# Patient Record
Sex: FEMALE | Race: WHITE | NOT HISPANIC OR LATINO | Employment: UNEMPLOYED | URBAN - NONMETROPOLITAN AREA
[De-identification: names, ages, dates, MRNs, and addresses within clinical notes are randomized per-mention and may not be internally consistent; named-entity substitution may affect disease eponyms.]

---

## 2017-04-13 LAB
EXTERNAL ABO GROUPING: NORMAL
EXTERNAL ANTIBODY SCREEN: POSITIVE
EXTERNAL CHLAMYDIA SCREEN: NEGATIVE
EXTERNAL GONORRHEA SCREEN: NEGATIVE
EXTERNAL HEPATITIS B SURFACE ANTIGEN: NEGATIVE
EXTERNAL RH FACTOR: NEGATIVE
EXTERNAL VDRL: NORMAL
HIV1 P24 AG SERPL QL IA: NEGATIVE

## 2017-10-04 LAB — EXTERNAL GROUP B STREP ANTIGEN: NEGATIVE

## 2017-10-14 ENCOUNTER — HOSPITAL ENCOUNTER (OUTPATIENT)
Facility: HOSPITAL | Age: 32
Discharge: HOME | End: 2017-10-14
Attending: OBSTETRICS & GYNECOLOGY | Admitting: OBSTETRICS & GYNECOLOGY

## 2017-10-14 VITALS
TEMPERATURE: 98.2 F | OXYGEN SATURATION: 98 % | HEART RATE: 100 BPM | HEIGHT: 69 IN | WEIGHT: 230 LBS | RESPIRATION RATE: 20 BRPM | BODY MASS INDEX: 34.07 KG/M2

## 2017-10-14 PROCEDURE — 59025 FETAL NON-STRESS TEST: CPT

## 2017-10-14 PROCEDURE — G0463 HOSPITAL OUTPT CLINIC VISIT: HCPCS

## 2017-10-14 RX ORDER — ACETAMINOPHEN 325 MG/1
TABLET ORAL
Status: DISCONTINUED
Start: 2017-10-14 | End: 2017-10-14 | Stop reason: HOSPADM

## 2017-10-14 RX ORDER — HYDROXYZINE PAMOATE 25 MG/1
CAPSULE ORAL
Status: COMPLETED
Start: 2017-10-14 | End: 2017-10-14

## 2017-10-14 RX ORDER — BUTALBITAL, ACETAMINOPHEN AND CAFFEINE 50; 325; 40 MG/1; MG/1; MG/1
1 TABLET ORAL EVERY 6 HOURS SCHEDULED
COMMUNITY

## 2017-10-14 RX ORDER — ACETAMINOPHEN 325 MG/1
TABLET ORAL
Status: COMPLETED
Start: 2017-10-14 | End: 2017-10-14

## 2017-10-14 RX ORDER — GABAPENTIN 600 MG/1
600 TABLET ORAL 3 TIMES DAILY
COMMUNITY

## 2017-10-14 RX ORDER — ACETAMINOPHEN 325 MG/1
650 TABLET ORAL ONCE
Status: COMPLETED | OUTPATIENT
Start: 2017-10-14 | End: 2017-10-14

## 2017-10-14 RX ORDER — HYDROXYZINE PAMOATE 25 MG/1
25 CAPSULE ORAL ONCE
Status: COMPLETED | OUTPATIENT
Start: 2017-10-14 | End: 2017-10-14

## 2017-10-14 RX ADMIN — ACETAMINOPHEN 650 MG: 325 TABLET ORAL at 20:57

## 2017-10-14 RX ADMIN — HYDROXYZINE PAMOATE 25 MG: 25 CAPSULE ORAL at 20:58

## 2017-10-15 NOTE — NON STRESS TEST
Brian Bartlett, a  at 37w2d with an LJ of 2017, by Ultrasound, was seen at [facility] LABOR DELIVERY for a nonstress test.    Chief Complaint   Patient presents with   • Contractions     Contractions increase in strength and closeness since 10/13/17 1400.        Interpretation A  Nonstress Test Interpretation A: Reactive (10/14/17 2008 : Malcolm Murphy RN)  Comments A: Assessed by AUGUSTINE Rojas and AUGUSTINE Evans (10/14/17 2008 : Malcolm Murphy RN)

## 2017-10-15 NOTE — DISCHARGE INSTRUCTIONS
Return if contractions increase in strength and closeness. (Approx 2-5 minutes apart for at least an hour) Return if you are leaking fluids, have bright red vaginal bleeding, or a decrease in fetal movement. Call MD with any questions.     
13-May-2017 22:12

## 2017-10-20 ENCOUNTER — HOSPITAL ENCOUNTER (OUTPATIENT)
Facility: HOSPITAL | Age: 32
Discharge: HOME | End: 2017-10-20
Attending: OBSTETRICS & GYNECOLOGY | Admitting: OBSTETRICS & GYNECOLOGY

## 2017-10-20 VITALS — RESPIRATION RATE: 20 BRPM | OXYGEN SATURATION: 99 % | TEMPERATURE: 97.9 F | HEART RATE: 92 BPM

## 2017-10-20 LAB
BACTERIA UR QL AUTO: ABNORMAL /HPF
BILIRUB UR QL STRIP: NEGATIVE
CANDIDA ALBICANS: NEGATIVE
CLARITY UR: ABNORMAL
COLOR UR: YELLOW
GARDNERELLA VAGINALIS: NEGATIVE
GLUCOSE UR STRIP-MCNC: NEGATIVE MG/DL
HGB UR QL STRIP.AUTO: NEGATIVE
HYALINE CASTS UR QL AUTO: ABNORMAL /LPF
KETONES UR QL STRIP: NEGATIVE
LEUKOCYTE ESTERASE UR QL STRIP.AUTO: ABNORMAL
NITRITE UR QL STRIP: NEGATIVE
PH UR STRIP.AUTO: 7 [PH] (ref 5–9)
PROT UR QL STRIP: NEGATIVE
RBC # UR: ABNORMAL /HPF
REF LAB TEST METHOD: ABNORMAL
SP GR UR STRIP: 1.02 (ref 1–1.03)
SQUAMOUS #/AREA URNS HPF: ABNORMAL /HPF
TRICHOMONAS VAGINALIS PCR: NEGATIVE
UNIDENT CRYS URNS QL MICRO: ABNORMAL /HPF
UROBILINOGEN UR QL STRIP: ABNORMAL
WBC UR QL AUTO: ABNORMAL /HPF

## 2017-10-20 PROCEDURE — 87660 TRICHOMONAS VAGIN DIR PROBE: CPT | Performed by: OBSTETRICS & GYNECOLOGY

## 2017-10-20 PROCEDURE — G0463 HOSPITAL OUTPT CLINIC VISIT: HCPCS | Performed by: NURSE PRACTITIONER

## 2017-10-20 PROCEDURE — 87480 CANDIDA DNA DIR PROBE: CPT | Performed by: OBSTETRICS & GYNECOLOGY

## 2017-10-20 PROCEDURE — 59025 FETAL NON-STRESS TEST: CPT | Performed by: NURSE PRACTITIONER

## 2017-10-20 PROCEDURE — 87510 GARDNER VAG DNA DIR PROBE: CPT | Performed by: OBSTETRICS & GYNECOLOGY

## 2017-10-20 PROCEDURE — 81001 URINALYSIS AUTO W/SCOPE: CPT | Performed by: OBSTETRICS & GYNECOLOGY

## 2017-10-21 NOTE — NON STRESS TEST
Brian LASHAE Bartlett, a  at 38w1d with an LJ of 2017, by Ultrasound, was seen at [facility] LABOR DELIVERY for a nonstress test.    Pt presents with c/o ctx and reports they began hurting this afternoon around 1400 and were about 8 minutes apart.  Pt denies any vaginal bleeding or leaking of fluid.

## 2017-10-21 NOTE — DISCHARGE INSTR - OTHER ORDERS
Return to labor and delivery for painful, regular contractions, bright red vaginal bleeding, decreased fetal movement, or if your water breaks.  Keep your next appointment.

## 2017-10-23 ENCOUNTER — HOSPITAL ENCOUNTER (OUTPATIENT)
Facility: HOSPITAL | Age: 32
Discharge: HOME | End: 2017-10-23
Attending: OBSTETRICS & GYNECOLOGY | Admitting: OBSTETRICS & GYNECOLOGY

## 2017-10-23 VITALS
DIASTOLIC BLOOD PRESSURE: 65 MMHG | RESPIRATION RATE: 20 BRPM | TEMPERATURE: 98.1 F | OXYGEN SATURATION: 99 % | HEART RATE: 88 BPM | SYSTOLIC BLOOD PRESSURE: 107 MMHG

## 2017-10-23 LAB
AMPHET+METHAMPHET UR QL: NEGATIVE
BACTERIA UR QL AUTO: ABNORMAL /HPF
BARBITURATES UR QL SCN: NEGATIVE
BENZODIAZ UR QL SCN: NEGATIVE
BILIRUB UR QL STRIP: NEGATIVE
CANDIDA ALBICANS: NEGATIVE
CANNABINOIDS SERPL QL: NEGATIVE
CLARITY UR: ABNORMAL
COCAINE UR QL: NEGATIVE
COLOR UR: YELLOW
GARDNERELLA VAGINALIS: POSITIVE
GLUCOSE UR STRIP-MCNC: NEGATIVE MG/DL
HGB UR QL STRIP.AUTO: ABNORMAL
HYALINE CASTS UR QL AUTO: ABNORMAL /LPF
KETONES UR QL STRIP: NEGATIVE
LEUKOCYTE ESTERASE UR QL STRIP.AUTO: ABNORMAL
METHADONE UR QL SCN: NEGATIVE
NITRITE UR QL STRIP: NEGATIVE
OPIATES UR QL: NEGATIVE
OXYCODONE UR QL SCN: NEGATIVE
PH UR STRIP.AUTO: 6 [PH] (ref 5–9)
PROT UR QL STRIP: NEGATIVE
RBC # UR: ABNORMAL /HPF
REF LAB TEST METHOD: ABNORMAL
SP GR UR STRIP: 1.02 (ref 1–1.03)
SQUAMOUS #/AREA URNS HPF: ABNORMAL /HPF
TRICHOMONAS VAGINALIS PCR: NEGATIVE
UROBILINOGEN UR QL STRIP: ABNORMAL
WBC UR QL AUTO: ABNORMAL /HPF

## 2017-10-23 PROCEDURE — 87660 TRICHOMONAS VAGIN DIR PROBE: CPT | Performed by: OBSTETRICS & GYNECOLOGY

## 2017-10-23 PROCEDURE — 87480 CANDIDA DNA DIR PROBE: CPT | Performed by: OBSTETRICS & GYNECOLOGY

## 2017-10-23 PROCEDURE — 80307 DRUG TEST PRSMV CHEM ANLYZR: CPT | Performed by: OBSTETRICS & GYNECOLOGY

## 2017-10-23 PROCEDURE — G0463 HOSPITAL OUTPT CLINIC VISIT: HCPCS

## 2017-10-23 PROCEDURE — 81001 URINALYSIS AUTO W/SCOPE: CPT | Performed by: OBSTETRICS & GYNECOLOGY

## 2017-10-23 PROCEDURE — 87510 GARDNER VAG DNA DIR PROBE: CPT | Performed by: OBSTETRICS & GYNECOLOGY

## 2017-10-23 PROCEDURE — 59020 FETAL CONTRACT STRESS TEST: CPT

## 2017-10-23 PROCEDURE — 59025 FETAL NON-STRESS TEST: CPT

## 2017-10-23 RX ORDER — METRONIDAZOLE 500 MG/1
2000 TABLET ORAL ONCE
Status: COMPLETED | OUTPATIENT
Start: 2017-10-23 | End: 2017-10-23

## 2017-10-23 RX ADMIN — METRONIDAZOLE 2000 MG: 500 TABLET ORAL at 08:26

## 2017-10-23 NOTE — NON STRESS TEST
Brian Bartlett, a  at 38w4d with an LJ of 2017, by Ultrasound, was seen at [facility] LABOR DELIVERY for a nonstress test.    Chief Complaint   Patient presents with   • Rupture of Membranes     Thinks her water broke at 4am       Interpretation A  Nonstress Test Interpretation A: Reactive (10/23/17 0808 : Coco Dodson, RN)  Comments A: Confirmed with Taylor Rojas RN (10/23/17 0808 : Coco Dodson, AUGUSTINE)